# Patient Record
Sex: FEMALE | Race: BLACK OR AFRICAN AMERICAN | NOT HISPANIC OR LATINO | Employment: OTHER | URBAN - METROPOLITAN AREA
[De-identification: names, ages, dates, MRNs, and addresses within clinical notes are randomized per-mention and may not be internally consistent; named-entity substitution may affect disease eponyms.]

---

## 2017-08-25 ENCOUNTER — HOSPITAL ENCOUNTER (EMERGENCY)
Facility: OTHER | Age: 82
Discharge: HOME | End: 2017-08-25
Attending: EMERGENCY MEDICINE
Payer: MEDICARE

## 2017-08-25 VITALS
HEART RATE: 64 BPM | HEIGHT: 60 IN | WEIGHT: 115 LBS | OXYGEN SATURATION: 96 % | TEMPERATURE: 99 F | BODY MASS INDEX: 22.58 KG/M2 | DIASTOLIC BLOOD PRESSURE: 72 MMHG | SYSTOLIC BLOOD PRESSURE: 168 MMHG | RESPIRATION RATE: 18 BRPM

## 2017-08-25 DIAGNOSIS — R53.83 FATIGUE: ICD-10-CM

## 2017-08-25 DIAGNOSIS — R53.1 WEAKNESS: ICD-10-CM

## 2017-08-25 DIAGNOSIS — N39.0 URINARY TRACT INFECTION WITHOUT HEMATURIA, SITE UNSPECIFIED: Primary | ICD-10-CM

## 2017-08-25 DIAGNOSIS — R10.9 ABDOMINAL PAIN: ICD-10-CM

## 2017-08-25 DIAGNOSIS — I15.9 SECONDARY HYPERTENSION: ICD-10-CM

## 2017-08-25 LAB
ALBUMIN SERPL BCP-MCNC: 2.9 G/DL
ALP SERPL-CCNC: 70 U/L
ALT SERPL W/O P-5'-P-CCNC: 14 U/L
ANION GAP SERPL CALC-SCNC: 5 MMOL/L
AST SERPL-CCNC: 18 U/L
BACTERIA #/AREA URNS HPF: ABNORMAL /HPF
BASOPHILS # BLD AUTO: 0.01 K/UL
BASOPHILS NFR BLD: 0.2 %
BILIRUB SERPL-MCNC: 0.3 MG/DL
BILIRUB UR QL STRIP: NEGATIVE
BUN SERPL-MCNC: 26 MG/DL
CALCIUM SERPL-MCNC: 9.1 MG/DL
CHLORIDE SERPL-SCNC: 106 MMOL/L
CLARITY UR: ABNORMAL
CO2 SERPL-SCNC: 30 MMOL/L
COLOR UR: YELLOW
CREAT SERPL-MCNC: 0.6 MG/DL
DIFFERENTIAL METHOD: ABNORMAL
EOSINOPHIL # BLD AUTO: 0.1 K/UL
EOSINOPHIL NFR BLD: 2.7 %
ERYTHROCYTE [DISTWIDTH] IN BLOOD BY AUTOMATED COUNT: 14.8 %
EST. GFR  (AFRICAN AMERICAN): >60 ML/MIN/1.73 M^2
EST. GFR  (NON AFRICAN AMERICAN): >60 ML/MIN/1.73 M^2
GLUCOSE SERPL-MCNC: 85 MG/DL
GLUCOSE UR QL STRIP: NEGATIVE
HCT VFR BLD AUTO: 38.2 %
HGB BLD-MCNC: 12.4 G/DL
HGB UR QL STRIP: ABNORMAL
KETONES UR QL STRIP: NEGATIVE
LEUKOCYTE ESTERASE UR QL STRIP: ABNORMAL
LYMPHOCYTES # BLD AUTO: 1.1 K/UL
LYMPHOCYTES NFR BLD: 20.9 %
MCH RBC QN AUTO: 29.7 PG
MCHC RBC AUTO-ENTMCNC: 32.5 G/DL
MCV RBC AUTO: 91 FL
MICROSCOPIC COMMENT: ABNORMAL
MONOCYTES # BLD AUTO: 0.4 K/UL
MONOCYTES NFR BLD: 7 %
NEUTROPHILS # BLD AUTO: 3.6 K/UL
NEUTROPHILS NFR BLD: 69 %
NITRITE UR QL STRIP: NEGATIVE
PH UR STRIP: 7 [PH] (ref 5–8)
PLATELET # BLD AUTO: 186 K/UL
PMV BLD AUTO: 10.3 FL
POTASSIUM SERPL-SCNC: 4.1 MMOL/L
PROT SERPL-MCNC: 6.6 G/DL
PROT UR QL STRIP: NEGATIVE
RBC # BLD AUTO: 4.18 M/UL
RBC #/AREA URNS HPF: 5 /HPF (ref 0–4)
SODIUM SERPL-SCNC: 141 MMOL/L
SP GR UR STRIP: 1.02 (ref 1–1.03)
TROPONIN I SERPL DL<=0.01 NG/ML-MCNC: <0.006 NG/ML
URN SPEC COLLECT METH UR: ABNORMAL
UROBILINOGEN UR STRIP-ACNC: NEGATIVE EU/DL
WBC # BLD AUTO: 5.26 K/UL
WBC #/AREA URNS HPF: 15 /HPF (ref 0–5)

## 2017-08-25 PROCEDURE — 96374 THER/PROPH/DIAG INJ IV PUSH: CPT

## 2017-08-25 PROCEDURE — 80053 COMPREHEN METABOLIC PANEL: CPT

## 2017-08-25 PROCEDURE — 96361 HYDRATE IV INFUSION ADD-ON: CPT

## 2017-08-25 PROCEDURE — 99285 EMERGENCY DEPT VISIT HI MDM: CPT | Mod: 25

## 2017-08-25 PROCEDURE — 85025 COMPLETE CBC W/AUTO DIFF WBC: CPT

## 2017-08-25 PROCEDURE — 81000 URINALYSIS NONAUTO W/SCOPE: CPT

## 2017-08-25 PROCEDURE — 84484 ASSAY OF TROPONIN QUANT: CPT

## 2017-08-25 PROCEDURE — P9612 CATHETERIZE FOR URINE SPEC: HCPCS

## 2017-08-25 PROCEDURE — 93010 ELECTROCARDIOGRAM REPORT: CPT | Mod: ,,, | Performed by: INTERNAL MEDICINE

## 2017-08-25 PROCEDURE — 63600175 PHARM REV CODE 636 W HCPCS: Performed by: EMERGENCY MEDICINE

## 2017-08-25 PROCEDURE — 25000003 PHARM REV CODE 250: Performed by: EMERGENCY MEDICINE

## 2017-08-25 PROCEDURE — 93005 ELECTROCARDIOGRAM TRACING: CPT

## 2017-08-25 RX ORDER — SULFAMETHOXAZOLE AND TRIMETHOPRIM 800; 160 MG/1; MG/1
1 TABLET ORAL
Status: COMPLETED | OUTPATIENT
Start: 2017-08-25 | End: 2017-08-25

## 2017-08-25 RX ORDER — SODIUM CHLORIDE 9 MG/ML
500 INJECTION, SOLUTION INTRAVENOUS
Status: COMPLETED | OUTPATIENT
Start: 2017-08-25 | End: 2017-08-25

## 2017-08-25 RX ORDER — HYDRALAZINE HYDROCHLORIDE 20 MG/ML
20 INJECTION INTRAMUSCULAR; INTRAVENOUS
Status: COMPLETED | OUTPATIENT
Start: 2017-08-25 | End: 2017-08-25

## 2017-08-25 RX ORDER — MEMANTINE HYDROCHLORIDE 10 MG/1
10 TABLET ORAL 2 TIMES DAILY
COMMUNITY

## 2017-08-25 RX ORDER — ALENDRONATE SODIUM 70 MG/1
70 TABLET ORAL
COMMUNITY

## 2017-08-25 RX ORDER — SULFAMETHOXAZOLE AND TRIMETHOPRIM 800; 160 MG/1; MG/1
1 TABLET ORAL 2 TIMES DAILY
Qty: 14 TABLET | Refills: 0 | Status: SHIPPED | OUTPATIENT
Start: 2017-08-25 | End: 2017-09-01

## 2017-08-25 RX ORDER — DONEPEZIL HYDROCHLORIDE 10 MG/1
10 TABLET, FILM COATED ORAL 2 TIMES DAILY
COMMUNITY

## 2017-08-25 RX ADMIN — SODIUM CHLORIDE 500 ML: 0.9 INJECTION, SOLUTION INTRAVENOUS at 03:08

## 2017-08-25 RX ADMIN — SULFAMETHOXAZOLE AND TRIMETHOPRIM 1 TABLET: 800; 160 TABLET ORAL at 06:08

## 2017-08-25 RX ADMIN — HYDRALAZINE HYDROCHLORIDE 20 MG: 20 INJECTION INTRAMUSCULAR; INTRAVENOUS at 03:08

## 2017-08-25 NOTE — ED PROVIDER NOTES
"Encounter Date: 8/25/2017    SCRIBE #1 NOTE: I, Luisa Neal, am scribing for, and in the presence of, Dr. Salazar.       History     Chief Complaint   Patient presents with    Fatigue     + increased generalzied weakness x several days per family.        08/25/2017 3:18 PM     Joselin Hernandez is a 89 y.o. female with HTN and Alzheimer's dementia who presents to the ED via EMS with an onset of worsening fatigue over the last several days. Per daughter, the physical therapist called the daughter and told her to that the patient was lethargic and not responding to his questions. The daughter states that the patient has been "sleeping more than normal." Currently, the patient reports feeling "alright." At baseline, her systolic pressure ranges between 150-160. She is bed bound but rotates between her two daughters' homes every weekend. The patient denies fever, cough, chest pain, or any other symptoms at this time. No pertinent SHx noted.       The history is provided by the patient and a relative (daughter).     Review of patient's allergies indicates:  No Known Allergies  Past Medical History:   Diagnosis Date    Alzheimer's dementia     Hypertension      Past Surgical History:   Procedure Laterality Date    BLADDER SURGERY      HYSTERECTOMY      RIGHT HIP SURGERY      THYROID SURGERY       History reviewed. No pertinent family history.  Social History   Substance Use Topics    Smoking status: Never Smoker    Smokeless tobacco: Never Used    Alcohol use No     Review of Systems   Constitutional: Positive for fatigue. Negative for fever.   HENT: Negative for nosebleeds.    Eyes: Negative for visual disturbance.   Respiratory: Negative for shortness of breath.    Cardiovascular: Negative for chest pain.   Gastrointestinal: Negative for abdominal pain, diarrhea, nausea and vomiting.   Genitourinary: Negative for dysuria and hematuria.   Musculoskeletal: Negative for back pain and neck pain.   Skin: " Negative for rash.   Neurological: Negative for seizures, syncope and headaches.     Physical Exam     Initial Vitals [08/25/17 1435]   BP Pulse Resp Temp SpO2   (!) 234/100 (!) 53 16 97.7 °F (36.5 °C) 97 %      MAP       144.67         Physical Exam    Nursing note and vitals reviewed.  Constitutional: She appears well-developed and well-nourished.  Non-toxic appearance. She does not have a sickly appearance. She appears ill. No distress.   Chronically ill appearing. Not septic appearing.    HENT:   Head: Normocephalic and atraumatic.   Mouth/Throat: Mucous membranes are dry.   Oropharynx is clear.    Eyes: Conjunctivae, EOM and lids are normal. Pupils are equal, round, and reactive to light. Right eye exhibits no nystagmus. Left eye exhibits no nystagmus.   Neck: Trachea normal, normal range of motion and phonation normal. Neck supple.   Cardiovascular: Normal rate, regular rhythm and normal heart sounds. Exam reveals no gallop and no friction rub.    No murmur heard.  Pulmonary/Chest: Breath sounds normal. She has no wheezes. She has no rhonchi. She has no rales.   Abdominal: Soft. Normal appearance and bowel sounds are normal. There is tenderness. There is no rigidity, no rebound, no guarding, no tenderness at McBurney's point and negative Johnson's sign.   Mid abdominal pain to palpation.    Neurological: She is alert and oriented to person, place, and time. She has normal strength and normal reflexes. She displays normal reflexes. No cranial nerve deficit or sensory deficit. She displays a negative Romberg sign.   Skin: Skin is warm, dry and intact. No rash noted. No pallor.       ED Course   Procedures  Labs Reviewed   CBC W/ AUTO DIFFERENTIAL - Abnormal; Notable for the following:        Result Value    RDW 14.8 (*)     All other components within normal limits   COMPREHENSIVE METABOLIC PANEL - Abnormal; Notable for the following:     CO2 30 (*)     BUN, Bld 26 (*)     Albumin 2.9 (*)     Anion Gap 5 (*)      All other components within normal limits   URINALYSIS - Abnormal; Notable for the following:     Appearance, UA Cloudy (*)     Occult Blood UA Trace (*)     Leukocytes, UA Trace (*)     All other components within normal limits   URINALYSIS MICROSCOPIC - Abnormal; Notable for the following:     RBC, UA 5 (*)     WBC, UA 15 (*)     Bacteria, UA Many (*)     All other components within normal limits   TROPONIN I     Imaging Results          US Abdomen Complete (Final result)  Result time 08/25/17 17:04:56    Final result by Reema Peña MD (08/25/17 17:04:56)                 Impression:      No significant abnormality.  Specifically no aneurysm of the visualized proximal or distal aorta, noting bowel gas did obscure visualization of portions of the aorta.      Electronically signed by: REEMA PEÑA  Date:     08/25/17  Time:    17:04              Narrative:    HISTORY:  Abdominal pain.  Concern for AAA.     TECHNIQUE: Complete abdominal ultrasound    COMPARISON: None.    FINDINGS:    Pancreas (visualized portion): Normal    Liver: Normal size with homogeneous echotexture.  No focal lesions.      Gallbladder: No calculi.  No wall thickening.  No sonographic Johnson sign.       Bile ducts: Common bile duct is not dilated, 3 mm.  No dilated intrahepatic radicles.      Spleen: Normal size, 7.8 cm with homogeneous echotexture.    Aorta (visualized portion): No aneurysm of the visualized proximal or distal aorta, noting bowel gas did obscure visualization of portions of the aorta.    IVC (visualized portion): Normal appearance.    Right Kidney: 9 cm. No focal abnormalities. No pelvocaliectasis.  Left Kidney: 9 cm. No focal abnormalities. No pelvocaliectasis.    General: No ascites.                             X-Ray Chest AP Portable (Final result)  Result time 08/25/17 15:39:57    Final result by Ju Brumfield MD (08/25/17 15:39:57)                 Impression:     As above      Electronically signed by: Ju Brumfield  MD  Date:     08/25/17  Time:    15:39              Narrative:    Comparison is the radiograph from 2015.    Single view.    The cardiac size is not enlarged.  There is calcification along the wall of the aorta.  Osseous structures demonstrate demineralization along with degenerative change.  Lung fields are clear.  There is no significant volume of right pleural fluid on this single view however minimal blunting of the left costophrenic angle suggests small volume left pleural fluid.    There is some apparent tracheal deviation however this may in part be secondary to patient positioning.  This can be followed with nonemergent two-view chest with better positioning for confirmation.                            EKG Readings: (Independently Interpreted)   Initial Reading: No STEMI.   Sinus bradycardia at a rate of 49 bpm with normal intervals and narrow QRS. No acute ST or T-wave abnormalities.      X-Rays:   Independently Interpreted Readings:   Chest X-Ray: Normal heart size. No infiltrate. No bony deformity. No acute disease.      Medical Decision Making:   History:   Old Medical Records: I decided to obtain old medical records.  Clinical Tests:   Lab Tests: Ordered and Reviewed  Radiological Study: Reviewed and Ordered  Medical Tests: Ordered and Reviewed  ED Management:  89-year-old female presents with complaint of high blood pressure.  The family states she's been fatigued.  When asking the patient she states she feels fine and has no complaints.  The patient has been bedridden for 3 years.  Her blood pressure is 230/120.  We'll give hydralazine.  We'll get labs EKG and a chest x-ray and urine to evaluate for any signs of endorgan damage.  Patient otherwise stable at this time.    After the hydralazine blood pressure came down nicely to 163/90.  Labs reviewed showingacute abnormality.  EKG is normal.  Chest x-ray is normal.  Urinalysis does appear to be possible for urinary tract infection.  We'll obtain a  urine culture and start treating with Bactrim.  This point patient's otherwise stable.  She states she does not want stay in the hospital would prefer to go home.  After discussing with the patient, as well as the patient's daughter who is at bedside and shared medical decision making I feel this is safe.  I feel there is a strong risk of nosocomial infection if brought in the hospital.  We'll treated as an outpatient with return precautions given.            Scribe Attestation:   Scribe #1: I performed the above scribed service and the documentation accurately describes the services I performed. I attest to the accuracy of the note.    Attending Attestation:           Physician Attestation for Scribe:  Physician Attestation Statement for Scribe #1: I, Dr. Salazar, reviewed documentation, as scribed by Luisa Neal in my presence, and it is both accurate and complete.                 ED Course     Clinical Impression:     1. Urinary tract infection without hematuria, site unspecified    2. Fatigue    3. Weakness    4. Abdominal pain    5. Secondary hypertension          Disposition:   Disposition: Discharged  Condition: Stable                        Jack Salazar,   08/25/17 [id number]

## 2017-08-25 NOTE — ED NOTES
Assisted pt with dressing and new diaper placement. Pt daughter reporting pt OK to sit upright in wheelchair. Pt placed into wheelchair with Rn and ED tech assistance. Pt able to sit upright. ED tech sitting with pt and pt waits for daughter up on ED ramp.

## 2017-08-25 NOTE — ED NOTES
Orders to discharge pt at this time per Dr. Salazar. MD okayed pt for discharge at this time, aware of current vital signs of /72 w/ HR of 64. Pt to be discharged with family.

## 2017-08-26 NOTE — ED NOTES
Pt with PMH of dementia per pt daughter and grand daughter. Family members reporting pt with increasing fatigue and high blood pressure x 3 days. Pt is lethargic, responds to verbal stimuli with simple answers. Pt oriented to self. Skin is dry and flaky. Respirations even and unlabored. No acute distress noted. Pt with no pain when asked using pain scale. Awaiting further orders. Pt updated on POC. Bed is locked and in lowest position with side rails up x2. Call bell within reach and pt oriented to use of call bell. Pt on continuous cardiac monitoring, continuous pulse ox, and continuous BP cuff. Will continue to monitor. Family at bedside.